# Patient Record
Sex: FEMALE | Race: WHITE | NOT HISPANIC OR LATINO | URBAN - METROPOLITAN AREA
[De-identification: names, ages, dates, MRNs, and addresses within clinical notes are randomized per-mention and may not be internally consistent; named-entity substitution may affect disease eponyms.]

---

## 2024-08-22 ENCOUNTER — OFFICE VISIT (OUTPATIENT)
Dept: URGENT CARE | Age: 24
End: 2024-08-22

## 2024-08-22 VITALS
TEMPERATURE: 97.9 F | RESPIRATION RATE: 16 BRPM | HEART RATE: 66 BPM | SYSTOLIC BLOOD PRESSURE: 110 MMHG | DIASTOLIC BLOOD PRESSURE: 60 MMHG | OXYGEN SATURATION: 99 %

## 2024-08-22 DIAGNOSIS — O21.9 NAUSEA AND VOMITING IN PREGNANCY (CMD): Primary | ICD-10-CM

## 2024-08-22 PROCEDURE — 99202 OFFICE O/P NEW SF 15 MIN: CPT | Performed by: FAMILY MEDICINE

## 2025-04-15 ENCOUNTER — APPOINTMENT (OUTPATIENT)
Dept: GENERAL RADIOLOGY | Facility: HOSPITAL | Age: 25
End: 2025-04-15
Payer: MEDICAID

## 2025-04-15 ENCOUNTER — HOSPITAL ENCOUNTER (EMERGENCY)
Facility: HOSPITAL | Age: 25
Discharge: HOME | End: 2025-04-15
Attending: EMERGENCY MEDICINE
Payer: MEDICAID

## 2025-04-15 VITALS
HEIGHT: 66 IN | TEMPERATURE: 98 F | DIASTOLIC BLOOD PRESSURE: 65 MMHG | WEIGHT: 123 LBS | OXYGEN SATURATION: 99 % | BODY MASS INDEX: 19.77 KG/M2 | RESPIRATION RATE: 17 BRPM | SYSTOLIC BLOOD PRESSURE: 101 MMHG | HEART RATE: 67 BPM

## 2025-04-15 DIAGNOSIS — L03.113 CELLULITIS OF RIGHT HAND: Primary | ICD-10-CM

## 2025-04-15 PROCEDURE — 73130 X-RAY EXAM OF HAND: CPT

## 2025-04-15 PROCEDURE — 87186 SC STD MICRODIL/AGAR DIL: CPT | Performed by: EMERGENCY MEDICINE

## 2025-04-15 PROCEDURE — 87205 SMEAR GRAM STAIN: CPT | Performed by: EMERGENCY MEDICINE

## 2025-04-15 PROCEDURE — 99284 EMERGENCY DEPT VISIT MOD MDM: CPT

## 2025-04-15 PROCEDURE — 87070 CULTURE OTHR SPECIMN AEROBIC: CPT | Performed by: EMERGENCY MEDICINE

## 2025-04-15 PROCEDURE — 87077 CULTURE AEROBIC IDENTIFY: CPT | Performed by: EMERGENCY MEDICINE

## 2025-04-15 RX ORDER — SULFAMETHOXAZOLE AND TRIMETHOPRIM 800; 160 MG/1; MG/1
1 TABLET ORAL 2 TIMES DAILY
Qty: 20 TABLET | Refills: 0 | Status: SHIPPED | OUTPATIENT
Start: 2025-04-15 | End: 2025-04-25

## 2025-04-15 NOTE — ED QUICK NOTES
Rounding Completed    Plan of Care reviewed. Waiting for Provider  Elimination needs assessed.  Offered warm blanket    Bed is locked and in lowest position. Call light within reach.

## 2025-04-15 NOTE — ED INITIAL ASSESSMENT (HPI)
Patient attended [employer] class on Saturday. Today. Right hand swelling and pus discharge today. Too painful to move hand. Redness to scab on middle knuckle

## 2025-04-15 NOTE — ED PROVIDER NOTES
Patient Seen in: [facility] Emergency Department      History     Chief Complaint   Patient presents with    Cellulitis     Stated Complaint: R hand cellulitis from kickboxing injury Saturday    Subjective:     HPI    25-year-old healthy female who reports injuring their hand 3 days ago while boxing, specifically attributing the injury to the tight wraps around her hand used during the activity.  She noticed pus coming from the wound today, April 15, 2025, and has been cleaning it.  No fever    Objective:   History reviewed. No pertinent past medical history.           No pertinent past surgical history.              No pertinent social history.            Review of Systems    Positive for stated complaint: R hand cellulitis from kickboxing injury Saturday  Other systems are as noted in HPI.  Constitutional and vital signs reviewed.      All other systems reviewed and negative except as noted above.    Physical Exam     ED Triage Vitals [04/15/25 1700]   /50   Pulse 64   Resp 18   Temp 97.8 °F (36.6 °C)   Temp src Temporal   SpO2 99 %   O2 Device None (Room air)       Current:/67   Pulse 64   Temp 97.8 °F (36.6 °C) (Temporal)   Resp 17   Ht 167.6 cm (5' 6\")   Wt 55.8 kg   SpO2 97%   BMI 19.85 kg/m²     General:  Vitals as listed.  No acute distress   HEENT: Sclerae anicteric.  Conjunctivae show no pallor.  Oropharynx clear, mucous membranes moist   Lungs: good air exchange   Extremities: Abrasion over right hand third MCP with minimal purulent drainage.  No crepitance.  Minimal overlying swelling/erythema.  No lymphangitis.  Neuro: Alert oriented and nonfocal      ED Course     Labs Reviewed   AEROBIC BACTERIAL CULTURE     XR HAND (MIN 3 VIEWS), RIGHT (CPT=73130)  Result Date: 4/15/2025  CONCLUSION:  No fracture or focal bony destructive lesion.  Soft tissue swelling without gas identified.   LOCATION:  [facility]   Dictated by (CST): Alexandr Cyr MD on 4/15/2025 at 5:54 PM     Finalized  by (CST): Alexandr Cyr MD on 4/15/2025 at 5:56 PM       ED COURSE and MDM   To take Bactrim for 10-day course.  Can use Tylenol/ibuprofen for discomfort.  To follow-up with Ortho.  She knows to return if fever or increasing redness/pain    I have discussed with the patient the results of testing, differential diagnosis, and treatment plan. They expressed clear understanding of these instructions and agrees to the plan provided.    Disposition and Plan     Clinical Impression:  1. Cellulitis of right hand         Disposition:  Discharge  4/15/2025  7:58 pm    Follow-up:  [facility], [address] [place] [id number] [phone]  Schedule an appointment as soon as possible for a visit in 1 week(s)          Medications Prescribed:  Current Discharge Medication List        START taking these medications    Details   sulfamethoxazole-trimethoprim -160 MG Oral Tab per tablet Take 1 tablet by mouth 2 (two) times daily for 10 days.  Qty: 20 tablet, Refills: 0